# Patient Record
Sex: FEMALE | Race: WHITE | NOT HISPANIC OR LATINO | URBAN - METROPOLITAN AREA
[De-identification: names, ages, dates, MRNs, and addresses within clinical notes are randomized per-mention and may not be internally consistent; named-entity substitution may affect disease eponyms.]

---

## 2022-03-04 ENCOUNTER — INPATIENT (INPATIENT)
Facility: HOSPITAL | Age: 62
LOS: 1 days | Discharge: HOME | DRG: 501 | End: 2022-03-06
Attending: STUDENT IN AN ORGANIZED HEALTH CARE EDUCATION/TRAINING PROGRAM | Admitting: STUDENT IN AN ORGANIZED HEALTH CARE EDUCATION/TRAINING PROGRAM
Payer: COMMERCIAL

## 2022-03-04 VITALS
HEART RATE: 93 BPM | DIASTOLIC BLOOD PRESSURE: 97 MMHG | OXYGEN SATURATION: 96 % | HEIGHT: 62 IN | RESPIRATION RATE: 18 BRPM | WEIGHT: 136.03 LBS | SYSTOLIC BLOOD PRESSURE: 152 MMHG | TEMPERATURE: 99 F

## 2022-03-04 DIAGNOSIS — G56.02 CARPAL TUNNEL SYNDROME, LEFT UPPER LIMB: ICD-10-CM
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
## 2022-03-04 DIAGNOSIS — T79.2XXA TRAUMATIC SECONDARY AND RECURRENT HEMORRHAGE AND SEROMA, INITIAL ENCOUNTER: ICD-10-CM

## 2022-03-04 DIAGNOSIS — S52.592B OTHER FRACTURES OF LOWER END OF LEFT RADIUS, INITIAL ENCOUNTER FOR OPEN FRACTURE TYPE I OR II: ICD-10-CM

## 2022-03-04 DIAGNOSIS — S52.502A UNSPECIFIED FRACTURE OF THE LOWER END OF LEFT RADIUS, INITIAL ENCOUNTER FOR CLOSED FRACTURE: ICD-10-CM

## 2022-03-04 DIAGNOSIS — M67.432 GANGLION, LEFT WRIST: ICD-10-CM
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
## 2022-03-04 DIAGNOSIS — S52.612A DISPLACED FRACTURE OF LEFT ULNA STYLOID PROCESS, INITIAL ENCOUNTER FOR CLOSED FRACTURE: ICD-10-CM

## 2022-03-04 LAB
ANION GAP SERPL CALC-SCNC: 13 MMOL/L — SIGNIFICANT CHANGE UP (ref 5–17)
APTT BLD: 30.4 SEC — SIGNIFICANT CHANGE UP (ref 27.5–35.5)
BASOPHILS # BLD AUTO: 0.07 K/UL — SIGNIFICANT CHANGE UP (ref 0–0.2)
BASOPHILS NFR BLD AUTO: 0.4 % — SIGNIFICANT CHANGE UP (ref 0–2)
BLD GP AB SCN SERPL QL: NEGATIVE — SIGNIFICANT CHANGE UP
BUN SERPL-MCNC: 18 MG/DL — SIGNIFICANT CHANGE UP (ref 7–23)
CALCIUM SERPL-MCNC: 9.7 MG/DL — SIGNIFICANT CHANGE UP (ref 8.4–10.5)
CHLORIDE SERPL-SCNC: 102 MMOL/L — SIGNIFICANT CHANGE UP (ref 96–108)
CO2 SERPL-SCNC: 24 MMOL/L — SIGNIFICANT CHANGE UP (ref 22–31)
CREAT SERPL-MCNC: 0.77 MG/DL — SIGNIFICANT CHANGE UP (ref 0.5–1.3)
EGFR: 88 ML/MIN/1.73M2 — SIGNIFICANT CHANGE UP
EOSINOPHIL # BLD AUTO: 0.01 K/UL — SIGNIFICANT CHANGE UP (ref 0–0.5)
EOSINOPHIL NFR BLD AUTO: 0.1 % — SIGNIFICANT CHANGE UP (ref 0–6)
GLUCOSE SERPL-MCNC: 119 MG/DL — HIGH (ref 70–99)
HCT VFR BLD CALC: 43.2 % — SIGNIFICANT CHANGE UP (ref 34.5–45)
HGB BLD-MCNC: 14.1 G/DL — SIGNIFICANT CHANGE UP (ref 11.5–15.5)
IMM GRANULOCYTES NFR BLD AUTO: 0.6 % — SIGNIFICANT CHANGE UP (ref 0–1.5)
INR BLD: 1.13 — SIGNIFICANT CHANGE UP (ref 0.88–1.16)
LYMPHOCYTES # BLD AUTO: 1.46 K/UL — SIGNIFICANT CHANGE UP (ref 1–3.3)
LYMPHOCYTES # BLD AUTO: 8.4 % — LOW (ref 13–44)
MCHC RBC-ENTMCNC: 29.7 PG — SIGNIFICANT CHANGE UP (ref 27–34)
MCHC RBC-ENTMCNC: 32.6 GM/DL — SIGNIFICANT CHANGE UP (ref 32–36)
MCV RBC AUTO: 90.9 FL — SIGNIFICANT CHANGE UP (ref 80–100)
MONOCYTES # BLD AUTO: 0.9 K/UL — SIGNIFICANT CHANGE UP (ref 0–0.9)
MONOCYTES NFR BLD AUTO: 5.2 % — SIGNIFICANT CHANGE UP (ref 2–14)
NEUTROPHILS # BLD AUTO: 14.89 K/UL — HIGH (ref 1.8–7.4)
NEUTROPHILS NFR BLD AUTO: 85.3 % — HIGH (ref 43–77)
NRBC # BLD: 0 /100 WBCS — SIGNIFICANT CHANGE UP (ref 0–0)
PLATELET # BLD AUTO: 260 K/UL — SIGNIFICANT CHANGE UP (ref 150–400)
POTASSIUM SERPL-MCNC: 4.4 MMOL/L — SIGNIFICANT CHANGE UP (ref 3.5–5.3)
POTASSIUM SERPL-SCNC: 4.4 MMOL/L — SIGNIFICANT CHANGE UP (ref 3.5–5.3)
PROTHROM AB SERPL-ACNC: 13.5 SEC — HIGH (ref 10.5–13.4)
RBC # BLD: 4.75 M/UL — SIGNIFICANT CHANGE UP (ref 3.8–5.2)
RBC # FLD: 12.8 % — SIGNIFICANT CHANGE UP (ref 10.3–14.5)
RH IG SCN BLD-IMP: POSITIVE — SIGNIFICANT CHANGE UP
SARS-COV-2 RNA SPEC QL NAA+PROBE: SIGNIFICANT CHANGE UP
SODIUM SERPL-SCNC: 139 MMOL/L — SIGNIFICANT CHANGE UP (ref 135–145)
WBC # BLD: 17.43 K/UL — HIGH (ref 3.8–10.5)
WBC # FLD AUTO: 17.43 K/UL — HIGH (ref 3.8–10.5)

## 2022-03-04 PROCEDURE — 99285 EMERGENCY DEPT VISIT HI MDM: CPT

## 2022-03-04 PROCEDURE — 99221 1ST HOSP IP/OBS SF/LOW 40: CPT

## 2022-03-04 PROCEDURE — 71045 X-RAY EXAM CHEST 1 VIEW: CPT | Mod: 26

## 2022-03-04 PROCEDURE — 73200 CT UPPER EXTREMITY W/O DYE: CPT | Mod: 26,LT

## 2022-03-04 PROCEDURE — 73100 X-RAY EXAM OF WRIST: CPT | Mod: 26,LT

## 2022-03-04 RX ORDER — CEFAZOLIN SODIUM 1 G
2000 VIAL (EA) INJECTION EVERY 8 HOURS
Refills: 0 | Status: DISCONTINUED | OUTPATIENT
Start: 2022-03-05 | End: 2022-03-05

## 2022-03-04 RX ORDER — CEFAZOLIN SODIUM 1 G
1000 VIAL (EA) INJECTION ONCE
Refills: 0 | Status: COMPLETED | OUTPATIENT
Start: 2022-03-04 | End: 2022-03-04

## 2022-03-04 RX ORDER — MORPHINE SULFATE 50 MG/1
2 CAPSULE, EXTENDED RELEASE ORAL ONCE
Refills: 0 | Status: DISCONTINUED | OUTPATIENT
Start: 2022-03-04 | End: 2022-03-04

## 2022-03-04 RX ORDER — IBUPROFEN 200 MG
600 TABLET ORAL ONCE
Refills: 0 | Status: DISCONTINUED | OUTPATIENT
Start: 2022-03-04 | End: 2022-03-04

## 2022-03-04 RX ORDER — HYDROMORPHONE HYDROCHLORIDE 2 MG/ML
0.5 INJECTION INTRAMUSCULAR; INTRAVENOUS; SUBCUTANEOUS EVERY 4 HOURS
Refills: 0 | Status: DISCONTINUED | OUTPATIENT
Start: 2022-03-04 | End: 2022-03-06

## 2022-03-04 RX ORDER — OXYCODONE HYDROCHLORIDE 5 MG/1
10 TABLET ORAL EVERY 4 HOURS
Refills: 0 | Status: DISCONTINUED | OUTPATIENT
Start: 2022-03-04 | End: 2022-03-06

## 2022-03-04 RX ORDER — POVIDONE-IODINE 5 %
1 AEROSOL (ML) TOPICAL ONCE
Refills: 0 | Status: COMPLETED | OUTPATIENT
Start: 2022-03-05 | End: 2022-03-05

## 2022-03-04 RX ORDER — ACETAMINOPHEN 500 MG
1000 TABLET ORAL ONCE
Refills: 0 | Status: COMPLETED | OUTPATIENT
Start: 2022-03-04 | End: 2022-03-05

## 2022-03-04 RX ORDER — ACETAMINOPHEN 500 MG
975 TABLET ORAL EVERY 8 HOURS
Refills: 0 | Status: DISCONTINUED | OUTPATIENT
Start: 2022-03-04 | End: 2022-03-06

## 2022-03-04 RX ORDER — OXYCODONE HYDROCHLORIDE 5 MG/1
5 TABLET ORAL EVERY 4 HOURS
Refills: 0 | Status: DISCONTINUED | OUTPATIENT
Start: 2022-03-04 | End: 2022-03-06

## 2022-03-04 RX ORDER — SODIUM CHLORIDE 9 MG/ML
1000 INJECTION, SOLUTION INTRAVENOUS
Refills: 0 | Status: DISCONTINUED | OUTPATIENT
Start: 2022-03-04 | End: 2022-03-06

## 2022-03-04 RX ORDER — SODIUM CHLORIDE 9 MG/ML
1000 INJECTION INTRAMUSCULAR; INTRAVENOUS; SUBCUTANEOUS ONCE
Refills: 0 | Status: COMPLETED | OUTPATIENT
Start: 2022-03-04 | End: 2022-03-04

## 2022-03-04 RX ORDER — MAGNESIUM HYDROXIDE 400 MG/1
30 TABLET, CHEWABLE ORAL DAILY
Refills: 0 | Status: DISCONTINUED | OUTPATIENT
Start: 2022-03-04 | End: 2022-03-06

## 2022-03-04 RX ORDER — ONDANSETRON 8 MG/1
4 TABLET, FILM COATED ORAL ONCE
Refills: 0 | Status: COMPLETED | OUTPATIENT
Start: 2022-03-04 | End: 2022-03-04

## 2022-03-04 RX ADMIN — MORPHINE SULFATE 2 MILLIGRAM(S): 50 CAPSULE, EXTENDED RELEASE ORAL at 19:54

## 2022-03-04 RX ADMIN — MORPHINE SULFATE 2 MILLIGRAM(S): 50 CAPSULE, EXTENDED RELEASE ORAL at 21:44

## 2022-03-04 RX ADMIN — SODIUM CHLORIDE 2000 MILLILITER(S): 9 INJECTION INTRAMUSCULAR; INTRAVENOUS; SUBCUTANEOUS at 19:53

## 2022-03-04 RX ADMIN — Medication 100 MILLIGRAM(S): at 19:53

## 2022-03-04 RX ADMIN — ONDANSETRON 4 MILLIGRAM(S): 8 TABLET, FILM COATED ORAL at 19:54

## 2022-03-04 NOTE — CONSULT NOTE ADULT - SUBJECTIVE AND OBJECTIVE BOX
Patient is a 62y old  Female who presents with a chief complaint of L open distal radius fracture (04 Mar 2022 21:21)      HPI:  62F LHD no PMH presents after fall onto outstretched L hand while ice skating. Felt immediate severe pain at the wrist followed by swelling. Presents to [facility] ED due to persistent symptoms. In ED, was found to have L open distal radius fracture. Denies any numbness or tingling. Denies injury elsewhere.    Patient works as an  and is visiting [place] from [place] for an art convention.     (04 Mar 2022 21:21)      REVIEW OF SYSTEMS: see HPI    PAST MEDICAL & SURGICAL HISTORY:  No pertinent past medical history        FAMILY HISTORY:    SOCIAL HISTORY:  Tobacco use:  EtOH use:  Illicit drug use:    MEDICATIONS:  MEDICATIONS  (STANDING):  acetaminophen     Tablet .. 975 milliGRAM(s) Oral every 8 hours  acetaminophen   IVPB .. 1000 milliGRAM(s) IV Intermittent once  lactated ringers. 1000 milliLiter(s) (100 mL/Hr) IV Continuous <Continuous>  multivitamin 1 Tablet(s) Oral daily    MEDICATIONS  (PRN):  bisacodyl Suppository 10 milliGRAM(s) Rectal daily PRN Constipation  HYDROmorphone  Injectable 0.5 milliGRAM(s) IV Push every 4 hours PRN Breakthrough  magnesium hydroxide Suspension 30 milliLiter(s) Oral daily PRN Constipation  oxyCODONE    IR 10 milliGRAM(s) Oral every 4 hours PRN Severe Pain (7 - 10)  oxyCODONE    IR 5 milliGRAM(s) Oral every 4 hours PRN Moderate Pain (4 - 6)      ALLERGIES:  Allergies    No Known Allergies    Intolerances        VITAL SIGNS:  Vital Signs Last 24 Hrs  T(C): 37 (04 Mar 2022 19:02), Max: 37 (04 Mar 2022 19:02)  T(F): 98.6 (04 Mar 2022 19:02), Max: 98.6 (04 Mar 2022 19:02)  HR: 93 (04 Mar 2022 19:02) (93 - 93)  BP: 152/97 (04 Mar 2022 19:02) (152/97 - 152/97)  BP(mean): --  RR: 18 (04 Mar 2022 19:02) (18 - 18)  SpO2: 96% (04 Mar 2022 19:02) (96% - 96%)      PHYSICAL EXAM:  Constitutional: WDWN resting comfortably in bed; NAD  Head: NC/AT  Eyes: PERRL, EOMI, anicteric sclera  ENT: no nasal discharge; uvula midline, no oropharyngeal erythema or exudates; MMM  Neck: supple; no JVD or thyromegaly  Respiratory: CTA B/L; no W/R/R, no retractions  Cardiac: +S1/S2; RRR; no M/R/G; PMI non-displaced  Gastrointestinal: abdomen soft, NT/ND; no rebound or guarding; +BSx4  Genitourinary: normal external genitalia  Back: spine midline, no bony tenderness or step-offs; no CVAT B/L  Extremities: WWP, no clubbing or cyanosis; no peripheral edema  Musculoskeletal: NROM x4; no joint swelling, tenderness or erythema  Vascular: 2+ radial, femoral, DP/PT pulses B/L  Dermatologic: skin warm, dry and intact; no rashes, wounds, or scars  Lymphatic: no submandibular or cervical LAD  Neurologic: AAOx3; CNII-XII grossly intact; no focal deficits  Psychiatric: affect and characteristics of appearance, verbalizations, behaviors are appropriate    LABS:                        14.1   17.43 )-----------( 260      ( 04 Mar 2022 20:05 )             43.2     03-04    139  |  102  |  18  ----------------------------<  119<H>  4.4   |  24  |  0.77    Ca    9.7      04 Mar 2022 20:05      PT/INR - ( 04 Mar 2022 20:05 )   PT: 13.5 sec;   INR: 1.13          PTT - ( 04 Mar 2022 20:05 )  PTT:30.4 sec        CAPILLARY BLOOD GLUCOSE              RADIOLOGY & ADDITIONAL TESTS: Reviewed. Patient is a 62y old  Female who presents with a chief complaint of L open distal radius fracture (04 Mar 2022 21:21)      HPI:  63 y/o F with no PMH presents after fall onto outstretched L hand while ice skating. Patient is here visiting [facility] from [place] and states that she Felt immediate severe pain at the wrist followed by swelling. Presents to [facility] ED due to persistent symptoms. In ED, was found to have L open distal radius fracture. Patient Denies fever, chills, chest pain, palpitations, SOB, cough, abdominal pain, nausea, vomiting, diarrhea, constipation, urinary frequency, urgency, or dysuria, headaches, changes in vision, dizziness, numbness, tingling. Denies recent travel, recent antibiotic use, or sick contacts.    Patient works as an  and is visiting [place] from [place] for an art convention.     (04 Mar 2022 21:21)      REVIEW OF SYSTEMS: see HPI    PAST MEDICAL & SURGICAL HISTORY:  No pertinent past medical history    FAMILY HISTORY:    SOCIAL HISTORY:  Tobacco use:  EtOH use:  Illicit drug use:    MEDICATIONS:  MEDICATIONS  (STANDING):  acetaminophen     Tablet .. 975 milliGRAM(s) Oral every 8 hours  acetaminophen   IVPB .. 1000 milliGRAM(s) IV Intermittent once  lactated ringers. 1000 milliLiter(s) (100 mL/Hr) IV Continuous <Continuous>  multivitamin 1 Tablet(s) Oral daily    MEDICATIONS  (PRN):  bisacodyl Suppository 10 milliGRAM(s) Rectal daily PRN Constipation  HYDROmorphone  Injectable 0.5 milliGRAM(s) IV Push every 4 hours PRN Breakthrough  magnesium hydroxide Suspension 30 milliLiter(s) Oral daily PRN Constipation  oxyCODONE    IR 10 milliGRAM(s) Oral every 4 hours PRN Severe Pain (7 - 10)  oxyCODONE    IR 5 milliGRAM(s) Oral every 4 hours PRN Moderate Pain (4 - 6)      ALLERGIES:  Allergies    No Known Allergies    Intolerances        VITAL SIGNS:  Vital Signs Last 24 Hrs  T(C): 37 (04 Mar 2022 19:02), Max: 37 (04 Mar 2022 19:02)  T(F): 98.6 (04 Mar 2022 19:02), Max: 98.6 (04 Mar 2022 19:02)  HR: 93 (04 Mar 2022 19:02) (93 - 93)  BP: 152/97 (04 Mar 2022 19:02) (152/97 - 152/97)  BP(mean): --  RR: 18 (04 Mar 2022 19:02) (18 - 18)  SpO2: 96% (04 Mar 2022 19:02) (96% - 96%)      PHYSICAL EXAM:  Constitutional: WDWN resting comfortably in bed; NAD  Head: NC/AT  Eyes: PERRL, EOMI, anicteric sclera  ENT: no nasal discharge; uvula midline, no oropharyngeal erythema or exudates; MMM  Neck: supple; no JVD or thyromegaly  Respiratory: CTA B/L; no W/R/R, no retractions  Cardiac: +S1/S2; RRR; no M/R/G; PMI non-displaced  Gastrointestinal: abdomen soft, NT/ND; no rebound or guarding; +BSx4  Genitourinary: normal external genitalia  Back: spine midline, no bony tenderness or step-offs; no CVAT B/L  Extremities: WWP, no clubbing or cyanosis; no peripheral edema  Musculoskeletal: L arm sling with brace noted   Vascular: 2+ radial, femoral, DP/PT pulses B/L  Lymphatic: no submandibular or cervical LAD  Neurologic: AAOx3; CNII-XII grossly intact; no focal deficits  Psychiatric: affect and characteristics of appearance, verbalizations, behaviors are appropriate    LABS:                        14.1   17.43 )-----------( 260      ( 04 Mar 2022 20:05 )             43.2     03-04    139  |  102  |  18  ----------------------------<  119<H>  4.4   |  24  |  0.77    Ca    9.7      04 Mar 2022 20:05    PT/INR - ( 04 Mar 2022 20:05 )   PT: 13.5 sec;   INR: 1.13       PTT - ( 04 Mar 2022 20:05 )  PTT:30.4 sec    CAPILLARY BLOOD GLUCOSE    RADIOLOGY & ADDITIONAL TESTS: Reviewed. Patient is a 62y old  Female who presents with a chief complaint of L open distal radius fracture (04 Mar 2022 21:21)    HPI:  61 y/o F with no PMH presents after fall onto outstretched L hand while ice skating. Patient is here visiting [facility] from [place] and states that she Felt immediate severe pain at the wrist followed by swelling. Presents to [facility] ED due to persistent symptoms. In ED, was found to have L open distal radius fracture. Patient Denies fever, chills, chest pain, palpitations, SOB, cough, abdominal pain, nausea, vomiting, diarrhea, constipation, urinary frequency, urgency, or dysuria, headaches, changes in vision, dizziness, numbness, tingling. Denies recent travel, recent antibiotic use, or sick contacts.    Patient works as an  and [place] from [place] for an art convention.     (04 Mar 2022 21:21)      REVIEW OF SYSTEMS: see HPI    PAST MEDICAL & SURGICAL HISTORY:  No pertinent past medical history    FAMILY HISTORY:    SOCIAL HISTORY:  Tobacco use:  EtOH use:  Illicit drug use:    MEDICATIONS:  MEDICATIONS  (STANDING):  acetaminophen     Tablet .. 975 milliGRAM(s) Oral every 8 hours  acetaminophen   IVPB .. 1000 milliGRAM(s) IV Intermittent once  lactated ringers. 1000 milliLiter(s) (100 mL/Hr) IV Continuous <Continuous>  multivitamin 1 Tablet(s) Oral daily    MEDICATIONS  (PRN):  bisacodyl Suppository 10 milliGRAM(s) Rectal daily PRN Constipation  HYDROmorphone  Injectable 0.5 milliGRAM(s) IV Push every 4 hours PRN Breakthrough  magnesium hydroxide Suspension 30 milliLiter(s) Oral daily PRN Constipation  oxyCODONE    IR 10 milliGRAM(s) Oral every 4 hours PRN Severe Pain (7 - 10)  oxyCODONE    IR 5 milliGRAM(s) Oral every 4 hours PRN Moderate Pain (4 - 6)      ALLERGIES:  Allergies    No Known Allergies    Intolerances        VITAL SIGNS:  Vital Signs Last 24 Hrs  T(C): 37 (04 Mar 2022 19:02), Max: 37 (04 Mar 2022 19:02)  T(F): 98.6 (04 Mar 2022 19:02), Max: 98.6 (04 Mar 2022 19:02)  HR: 93 (04 Mar 2022 19:02) (93 - 93)  BP: 152/97 (04 Mar 2022 19:02) (152/97 - 152/97)  BP(mean): --  RR: 18 (04 Mar 2022 19:02) (18 - 18)  SpO2: 96% (04 Mar 2022 19:02) (96% - 96%)      PHYSICAL EXAM:  Constitutional: WDWN resting comfortably in bed; NAD  Head: NC/AT  Eyes: PERRL, EOMI, anicteric sclera  ENT: no nasal discharge; uvula midline, no oropharyngeal erythema or exudates; MMM  Neck: supple; no JVD or thyromegaly  Respiratory: CTA B/L; no W/R/R, no retractions  Cardiac: +S1/S2; RRR; no M/R/G; PMI non-displaced  Gastrointestinal: abdomen soft, NT/ND; no rebound or guarding; +BSx4  Genitourinary: normal external genitalia  Back: spine midline, no bony tenderness or step-offs; no CVAT B/L  Extremities: WWP, no clubbing or cyanosis; no peripheral edema  Musculoskeletal: L arm sling with brace noted   Vascular: 2+ radial, femoral, DP/PT pulses B/L  Lymphatic: no submandibular or cervical LAD  Neurologic: AAOx3; CNII-XII grossly intact; no focal deficits  Psychiatric: affect and characteristics of appearance, verbalizations, behaviors are appropriate    LABS:                        14.1   17.43 )-----------( 260      ( 04 Mar 2022 20:05 )             43.2     03-04    139  |  102  |  18  ----------------------------<  119<H>  4.4   |  24  |  0.77    Ca    9.7      04 Mar 2022 20:05    PT/INR - ( 04 Mar 2022 20:05 )   PT: 13.5 sec;   INR: 1.13       PTT - ( 04 Mar 2022 20:05 )  PTT:30.4 sec    CAPILLARY BLOOD GLUCOSE    RADIOLOGY & ADDITIONAL TESTS: Reviewed. Patient is a 62y old  Female who presents with a chief complaint of L open distal radius fracture (04 Mar 2022 21:21)    HPI:  63 y/o F with no PMH presents after fall onto outstretched L hand while ice skating. Patient is here visiting [facility] from [place] and states that she Felt immediate severe pain at the wrist followed by swelling. Presents to [facility] ED due to persistent symptoms. In ED, was found to have L open distal radius fracture. Patient Denies fever, chills, chest pain, palpitations, SOB, cough, abdominal pain, nausea, vomiting, diarrhea, constipation, urinary frequency, urgency, or dysuria, headaches, changes in vision, dizziness, numbness, tingling. Denies recent travel, recent antibiotic use, or sick contacts.    Patient works as an  and is visiting [place] from [place] for an art convention.     (04 Mar 2022 21:21)      REVIEW OF SYSTEMS: see HPI    PAST MEDICAL & SURGICAL HISTORY:  No pertinent past medical history    FAMILY HISTORY: mother brain aneurysm, father w/ dementia     SOCIAL HISTORY:   Tobacco use: denies  EtOH use: denies    Illicit drug use: denies  works as a  in [place]    MEDICATIONS:  MEDICATIONS  (STANDING):  acetaminophen     Tablet .. 975 milliGRAM(s) Oral every 8 hours  acetaminophen   IVPB .. 1000 milliGRAM(s) IV Intermittent once  lactated ringers. 1000 milliLiter(s) (100 mL/Hr) IV Continuous <Continuous>  multivitamin 1 Tablet(s) Oral daily    MEDICATIONS  (PRN):  bisacodyl Suppository 10 milliGRAM(s) Rectal daily PRN Constipation  HYDROmorphone  Injectable 0.5 milliGRAM(s) IV Push every 4 hours PRN Breakthrough  magnesium hydroxide Suspension 30 milliLiter(s) Oral daily PRN Constipation  oxyCODONE    IR 10 milliGRAM(s) Oral every 4 hours PRN Severe Pain (7 - 10)  oxyCODONE    IR 5 milliGRAM(s) Oral every 4 hours PRN Moderate Pain (4 - 6)      ALLERGIES:  Allergies    No Known Allergies    Intolerances        VITAL SIGNS:  Vital Signs Last 24 Hrs  T(C): 37 (04 Mar 2022 19:02), Max: 37 (04 Mar 2022 19:02)  T(F): 98.6 (04 Mar 2022 19:02), Max: 98.6 (04 Mar 2022 19:02)  HR: 93 (04 Mar 2022 19:02) (93 - 93)  BP: 152/97 (04 Mar 2022 19:02) (152/97 - 152/97)  BP(mean): --  RR: 18 (04 Mar 2022 19:02) (18 - 18)  SpO2: 96% (04 Mar 2022 19:02) (96% - 96%)      PHYSICAL EXAM:  Constitutional: WDWN resting comfortably in bed; NAD  Head: NC/AT  Eyes: PERRL, EOMI, anicteric sclera  ENT: no nasal discharge; uvula midline, no oropharyngeal erythema or exudates; MMM  Neck: supple; no JVD or thyromegaly  Respiratory: CTA B/L; no W/R/R, no retractions  Cardiac: +S1/S2; RRR; no M/R/G; PMI non-displaced  Gastrointestinal: abdomen soft, NT/ND; no rebound or guarding; +BSx4  Genitourinary: normal external genitalia  Back: spine midline, no bony tenderness or step-offs; no CVAT B/L  Extremities: WWP, no clubbing or cyanosis; no peripheral edema  Musculoskeletal: L arm sling with brace noted   Vascular: 2+ radial, femoral, DP/PT pulses B/L  Lymphatic: no submandibular or cervical LAD  Neurologic: AAOx3; CNII-XII grossly intact; no focal deficits  Psychiatric: affect and characteristics of appearance, verbalizations, behaviors are appropriate    LABS:                        14.1   17.43 )-----------( 260      ( 04 Mar 2022 20:05 )             43.2     03-04    139  |  102  |  18  ----------------------------<  119<H>  4.4   |  24  |  0.77    Ca    9.7      04 Mar 2022 20:05    PT/INR - ( 04 Mar 2022 20:05 )   PT: 13.5 sec;   INR: 1.13       PTT - ( 04 Mar 2022 20:05 )  PTT:30.4 sec    CAPILLARY BLOOD GLUCOSE    RADIOLOGY & ADDITIONAL TESTS: Reviewed.

## 2022-03-04 NOTE — CONSULT NOTE ADULT - PROBLEM SELECTOR RECOMMENDATION 9
#L distal ulnar and radial fracture 2/2 to Mechanical Fall   Mildly displaced fractures of distal radius and ulna   - Analgesia PRN   - Scheduled for OR tomorrow AM as per the primary team

## 2022-03-04 NOTE — ED PROVIDER NOTE - OBJECTIVE STATEMENT
62 F LHD visiting from [place] referred from  for L wrist pain s/p injury found to have comminuted displaced L distal radius/ulna fracture.  pt was ice skating and lost footing falling backwards on L hand- no head trauma or loc.  Now w/ dull throbbing pain in L wrist w/ deformity and open wound to volar surface of wrist w/ + bleeding.  tetanus utd.  denies f/c, headache, neck/back pain, chest pain, sob, nv, numbness/weakness, pain in L shoulder/elbow/digits, other injuries.

## 2022-03-04 NOTE — ED PROVIDER NOTE - PHYSICAL EXAMINATION
Vitals reviewed  Gen: comfortable appearing, nad, speaking in full sentences  Skin: wwp, puncture wound w/ oozing blood noted to volar aspect wrist   HEENT: ncat, eomi, mmm  Neck/Back: no midline ttp/step off   CV: rrr, no audible m/r/g  Resp: symmetrical expansion, ctab, no w/r/r  LUE: + deformity and diffuse ttp over wrist, FROM elbow/shoulder and digits, cap refill < 2 sec, SILT, radial pulse 2+  FROM all other ext, NVI   Neuro: alert/oriented, no focal deficits, steady gait

## 2022-03-04 NOTE — H&P ADULT - ASSESSMENT
A/P: 62F presents with L open distal radius fracture following fall onto outstretched hand. Sugartong splint applied.  -NWB LUE in splint and sling  -Pain control per ED  -Medicine consulted for pre-op clearance  -Pre-op workup- CBC, BMP, PT/PTT/INR, T&S, UA, EKG, CXR, COVID  -Obtain post-splint CT scan and x-ray  -Pain control  -NPO/IVF past midnight  -Discussed with Dr. Huang

## 2022-03-04 NOTE — CONSULT NOTE ADULT - PROBLEM SELECTOR RECOMMENDATION 2
VSS  No signs of infection   - Obtain UA  - Trend WBC  - If febrile obtain septic ALCALA -EKG: NSR with no ischemic changes  -Social: denied smoking tobacco, alcohol use, or illicit drug use  -Self-reported physical activity: no limitation walking on flat ground or stairs    Assessment:  -Mets >4  -RCRI - Class I risk, indicating 3.9 % 30-day risk of death, MI, or cardiac arrest  -[place] - 0.0% risk of MI or cardiac arrest within 30 days of surgery    Patient is intermediate-risk for low intermediate-risk surgery. Optimized for procedure no further testing needed

## 2022-03-04 NOTE — ED PROVIDER NOTE - NSFOLLOWUPINSTRUCTIONS_ED_ALL_ED_FT
Take tylenol 650mg or motrin 400-800mg as needed every 4-6 hours for pain.   REST- Rest your hurting/injured joint or extremity to decrease pain and swelling for 24-48 hours    ICE- Apply ice to area of pain to decreased inflammation and pain, put towel/barrier between ice and skin. You can keep ice on for 20 minutes at a time 4-8 times daily   COMPRESSION- Wear ace wrap or brace for support to reduce swelling.  Make sure not to wrap too tight, loosen if skin feeling numb/tingling or skin turns blue   ELEVATION- Elevate hurting/injured area 6 or more inches about level of heart to decrease swelling/inflammation.  Use pillow under joint to elevate area      Wrist Fracture in Adults    WHAT YOU NEED TO KNOW:    A wrist fracture is a break in one or more of the bones in your wrist.     Adult Arm Bones         DISCHARGE INSTRUCTIONS:    Return to the emergency department if:   •Your pain gets worse or does not get better after you take pain medicine.      •Your cast or splint breaks, gets wet, or is damaged.      •Your hand or fingers feel numb or cold.      •Your hand or fingers turn white or blue.      •Your splint or cast feels too tight.      •You have more pain or swelling after the cast or splint is put on.      Call your doctor if:   •You have a fever.      •There is a foul smell or blood coming from under the cast.      •You have questions or concerns about your condition or care.      Medicines: You may need any of the following:   •Prescription pain medicine may be given. Ask your healthcare provider how to take this medicine safely. Some prescription pain medicines contain acetaminophen. Do not take other medicines that contain acetaminophen without talking to your healthcare provider. Too much acetaminophen may cause liver damage. Prescription pain medicine may cause constipation. Ask your healthcare provider how to prevent or treat constipation.       •NSAIDs, such as ibuprofen, help decrease swelling, pain, and fever. NSAIDs can cause stomach bleeding or kidney problems in certain people. If you take blood thinner medicine, always ask your healthcare provider if NSAIDs are safe for you. Always read the medicine label and follow directions.      •Acetaminophen decreases pain and fever. It is available without a doctor's order. Ask how much to take and how often to take it. Follow directions. Read the labels of all other medicines you are using to see if they also contain acetaminophen, or ask your doctor or pharmacist. Acetaminophen can cause liver damage if not taken correctly. Do not use more than 4 grams (4,000 milligrams) total of acetaminophen in one day.       •Take your medicine as directed. Contact your healthcare provider if you think your medicine is not helping or if you have side effects. Tell him or her if you are allergic to any medicine. Keep a list of the medicines, vitamins, and herbs you take. Include the amounts, and when and why you take them. Bring the list or the pill bottles to follow-up visits. Carry your medicine list with you in case of an emergency.      Self-care:   •Rest as much as possible. Do not play contact sports until the healthcare provider says it is okay.      •Apply ice on your wrist for 15 to 20 minutes every hour or as directed. Use an ice pack, or put crushed ice in a plastic bag. Cover it with a towel before you place it on your skin. Ice helps prevent tissue damage and decreases swelling and pain.      •Elevate your wrist above the level of your heart as often as possible. This will help decrease swelling and pain. Prop your wrist on pillows or blankets to keep it elevated comfortably.             Cast or splint care:   •You may take a bath or shower as directed. Do not let your cast or splint get wet. Before bathing, cover the cast or splint with 2 plastic trash bags. Tape the bags to your skin above the cast or splint to seal out the water. Keep your arm out of the water in case the bag breaks. If a plaster cast gets wet and soft, call your healthcare provider.      •Check the skin around the cast or splint every day. You may put lotion on any red or sore areas.      •Do not push down or lean on the cast or brace because it may break.      •Do not scratch the skin under the cast by putting a sharp or pointed object inside the cast.      Go to physical therapy as directed: You may need physical therapy after your wrist heals and the cast is removed. A physical therapist can teach you exercises to help improve movement and strength and to decrease pain.    Follow up with your doctor or bone specialist as directed: You may need to return to have your cast removed. You may also need an x-ray to check how well the bone has healed. Write down your questions so you remember to ask them during your visits.

## 2022-03-04 NOTE — CONSULT NOTE ADULT - ASSESSMENT
*******************INCOMPLETE NOTE#******************  62 F LHD no PMH presents after fall onto outstretched L hand while ice skating. Felt immediate severe pain at the wrist followed by swelling. Presents to [facility] ED due to persistent symptoms. In ED, was found to have L open distal radius fracture. Denies any numbness or tingling. Denies injury elsewhere.    #Reactive Leucocytosis in the setting of fall   VSS  No signs of infection   - Obtain UA  - Trend WBC  - If febrile obtain septic ALCALA     -EKG:  -Social:  -Self-reported physical activity: no limitation walking on flat ground or stairs    Assessment:  -Mets >4  -RCRI - Class ___ risk, indicating ___% 30-day risk of death, MI, or cardiac arrest  -Elly - ___% risk of MI or cardiac arrest within 30 days of surgery    _____ is ___-risk for ___-risk surgery. 62 F LHD no PMH presents after fall onto outstretched L hand while ice skating. Felt immediate severe pain at the wrist followed by swelling. Presents to [facility] ED due to persistent symptoms. In ED, was found to have L open distal radius fracture. Denies any numbness or tingling. Denies injury elsewhere. Medicine consulted for pre-op clearance    #Reactive Leucocytosis in the setting of fall   VSS  No signs of infection   - Obtain UA  - Trend WBC  - If febrile obtain septic ALCALA     #Pre-op  -EKG: NSR with no ischemic changes  -Social: denied smoking tobacco, alcohol use, or illicit drug use  -Self-reported physical activity: no limitation walking on flat ground or stairs    Assessment:  -Mets >4  -RCRI - Class I risk, indicating 3.9 % 30-day risk of death, MI, or cardiac arrest  -[place] - 0.0% risk of MI or cardiac arrest within 30 days of surgery    Patient is intermediate-risk for low intermediate-risk surgery.    #HCM   - Replete electrolytes accordingly  - Obtain TSH, b12, folate, a1c 62 F D no PMH presents after fall onto outstretched L hand while ice skating. Felt immediate severe pain at the wrist followed by swelling. Presents to [facility] ED due to persistent symptoms. In ED, was found to have L open distal radius fracture. Denies any numbness or tingling. Denies injury elsewhere. Medicine consulted for pre-op clearance      #L distal ulnar and radial fracture 2/2 to Mechanical Fall   Mildly displaced fractures of distal radius and ulna   - Analgesia PRN   - Scheduled for OR tomorrow AM as per the primary team     #Reactive Leucocytosis in the setting of fall   VSS  No signs of infection   - Obtain UA  - Trend WBC  - If febrile obtain septic ALCALA     #Pre-op  -EKG: NSR with no ischemic changes  -Social: denied smoking tobacco, alcohol use, or illicit drug use  -Self-reported physical activity: no limitation walking on flat ground or stairs    Assessment:  -Mets >4  -RCRI - Class I risk, indicating 3.9 % 30-day risk of death, MI, or cardiac arrest  -[place] - 0.0% risk of MI or cardiac arrest within 30 days of surgery    Patient is intermediate-risk for low intermediate-risk surgery.    #HCM   - Replete electrolytes accordingly  - Obtain TSH, b12, folate, a1c  - DVT PPX post op  62 F D no PMH presents after fall onto outstretched L hand while ice skating. Felt immediate severe pain at the wrist followed by swelling. Presents to [facility] ED due to persistent symptoms. In ED, was found to have L open distal radius fracture. Denies any numbness or tingling. Denies injury elsewhere. Medicine consulted for pre-op clearance      #L distal ulnar and radial fracture 2/2 to Mechanical Fall   Mildly displaced fractures of distal radius and ulna   - Analgesia PRN   - Scheduled for OR tomorrow AM as per the primary team     #Reactive Leucocytosis in the setting of fall   VSS  No signs of infection   - Obtain UA  - Trend WBC  - If febrile obtain septic ALCALA     #Pre-op  -EKG: NSR with no ischemic changes  -Social: denied smoking tobacco, alcohol use, or illicit drug use  -Self-reported physical activity: no limitation walking on flat ground or stairs    Assessment:  -Mets >4  -RCRI - Class I risk, indicating 3.9 % 30-day risk of death, MI, or cardiac arrest  -[place] - 0.0% risk of MI or cardiac arrest within 30 days of surgery    Patient is intermediate-risk for low intermediate-risk surgery.    #HCM   - Replete electrolytes accordingly  - DVT PPX post op  62 F D no PMH presents after fall onto outstretched L hand while ice skating. Felt immediate severe pain at the wrist followed by swelling. Presents to [facility] ED due to persistent symptoms. In ED, was found to have L open distal radius fracture. Denies any numbness or tingling. Denies injury elsewhere. Medicine consulted for pre-op clearance

## 2022-03-04 NOTE — CONSULT NOTE ADULT - PROBLEM SELECTOR RECOMMENDATION 3
-EKG: NSR with no ischemic changes  -Social: denied smoking tobacco, alcohol use, or illicit drug use  -Self-reported physical activity: no limitation walking on flat ground or stairs    Assessment:  -Mets >4  -RCRI - Class I risk, indicating 3.9 % 30-day risk of death, MI, or cardiac arrest  -[place] - 0.0% risk of MI or cardiac arrest within 30 days of surgery    Patient is intermediate-risk for low intermediate-risk surgery. -EKG: NSR with no ischemic changes  -Social: denied smoking tobacco, alcohol use, or illicit drug use  -Self-reported physical activity: no limitation walking on flat ground or stairs    Assessment:  -Mets >4  -RCRI - Class I risk, indicating 3.9 % 30-day risk of death, MI, or cardiac arrest  -[place] - 0.0% risk of MI or cardiac arrest within 30 days of surgery    Patient is intermediate-risk for low intermediate-risk surgery. Optimized for procedure no further testing needed VSS  No signs of infection   - Obtain UA  - Trend WBC  - If febrile obtain septic ALCALA

## 2022-03-04 NOTE — ED ADULT NURSE NOTE - CHIEF COMPLAINT
pt with allergic reaction s/p epi, symptoms improved, will continue to monitor. The patient is a 62y Female complaining of wrist pain/injury. pt with allergic reaction s/p epi, symptoms improved.  Will monitor ~6h for biphasic reactions.  EpiPen to 24-hours pharmacy to be picked up en-route home.  Ryan Archer MD

## 2022-03-04 NOTE — ED PROVIDER NOTE - CLINICAL SUMMARY MEDICAL DECISION MAKING FREE TEXT BOX
62 F LHD visiting from [place] referred from  for L wrist pain s/p injury found to have comminuted displaced L distal radius/ulna fracture.  on exam VSS, puncture wound w/ oozing blood noted to volar aspect wrist ;  LUE: + deformity and diffuse ttp over wrist, FROM elbow/shoulder and digits, cap refill < 2 sec, SILT, radial pulse 2+.  xrays from  uploaded,  concern for open fx will obtain labs, given analgesia, ancef and c/s ortho

## 2022-03-04 NOTE — ED ADULT NURSE NOTE - OBJECTIVE STATEMENT
61 y/o female s/p fall on left wrist while ice skating, deformity noted, open area on wrist noted, sent in by urgent care for further evaluation.

## 2022-03-04 NOTE — H&P ADULT - NSHPPHYSICALEXAM_GEN_ALL_CORE
Physical Exam:  General: NAD  LUE:  Swelling about wrist  Poke hole with continuous oozing to volar aspect of forearm  SILT throughout limb  Firing all distal musculature, full motor testing limited due to pain  Radial pulse palpable

## 2022-03-04 NOTE — H&P ADULT - HISTORY OF PRESENT ILLNESS
62F D no PMH presents after fall onto outstretched L hand while ice skating. Felt immediate severe pain at the wrist followed by swelling. Presents to [facility] ED due to persistent symptoms. In ED, was found to have L open distal radius fracture. Denies any numbness or tingling. Denies injury elsewhere.    Patient works as an  and is visiting [place] from [place] for an art convention.

## 2022-03-04 NOTE — ED PROVIDER NOTE - CARE PROVIDER_API CALL
Glynn Huang)  Orthopaedic Surgery  [address] [place] [address], [address]  [place] [address]  Phone: [phone]  Fax: [phone]  Follow Up Time:

## 2022-03-04 NOTE — CONSULT NOTE ADULT - ATTENDING COMMENTS
reviewed pertinent data , consult note  patient seen and examined overnight   a/f left distal radius / ulnar fx repair.   pt is an avid long distance runner, trains w/ 2 personal trainers    pleasant , healthy appearing , slightly anxious female; PE as above, sensation intact at left fingers    1. preop: pt is medically optimized for pending procedure. no further studies needed. will follow post op        rest of  plan as above

## 2022-03-04 NOTE — H&P ADULT - NSHPLABSRESULTS_GEN_ALL_CORE
Imaging:  X-ray wrist (AP, lateral, oblique) - Distal radius fracture, severely comminuted and impacted

## 2022-03-04 NOTE — ED PROVIDER NOTE - NS ED MD DISPO SPECIAL CONSIDERATION1
CC: f/u for  cellulitis right leg  Patient reports she is feeling better    REVIEW OF SYSTEMS:  All other review of systems negative (Comprehensive ROS)    Antimicrobials Day #  :3  ceFAZolin   IVPB 1000 milliGRAM(s) IV Intermittent every 8 hours    Other Medications Reviewed    T(F): 97.9 (01-09-20 @ 10:37), Max: 98.3 (01-08-20 @ 16:55)  HR: 73 (01-09-20 @ 10:37)  BP: 123/60 (01-09-20 @ 10:37)  RR: 17 (01-09-20 @ 10:37)  SpO2: 99% (01-09-20 @ 10:37)  Wt(kg): --    PHYSICAL EXAM:  General: alert, no acute distress  Eyes:  anicteric, no conjunctival injection, no discharge  Oropharynx: no lesions or injection 	  Neck: supple, without adenopathy  Lungs: clear to auscultation  Heart: regular rate and rhythm; no murmur, rubs or gallops  Abdomen: soft, nondistended, nontender, without mass or organomegaly  Skin: no lesions  Extremities: no clubbing, cyanosis,. right leg is much better lymphedema persists  Neurologic: alert, oriented, moves all extremities    LAB RESULTS:              MICROBIOLOGY:  RECENT CULTURES:  01-06 @ 22:45 .Blood Blood-Peripheral     No growth to date.          RADIOLOGY REVIEWED:  < from: US Duplex Venous Lower Ext Ltd, Right (01.06.20 @ 23:45) >    EXAM:  US DPLX LWR EXT VEINS LTD RT      PROCEDURE DATE:  01/06/2020        INTERPRETATION:  CLINICAL INFORMATION: Right lower extremity swelling and redness with pain for 2 days. History of metastatic cancer. Evaluate for DVT.    COMPARISON: Bilateral lower extremity venous duplex from 9/20/2016    TECHNIQUE: Duplex sonography of the RIGHT LOWER extremity veins with color and spectral Doppler, with and without compression.      FINDINGS:    There is normal compressibility of the right common femoral, femoral and popliteal veins. No calf vein thrombosis is detected.    The contralateral common femoral vein is patent.    Doppler examination shows normal spontaneous and phasic flow.    There is nonspecific moderate subcutaneous edema in the right calf.    IMPRESSION:     No evidence of right lower extremity deep venous thrombosis.    < end of copied text >              Assessment:  patient with lymphedema legs admitted with recurrent episode of cellulitis of the right leg which is now improved on cefazolin.   Plan:  change to po cefadroxil 1gram po bid for another 11 days  no ID objection to d/c home  would give a script to have cefadroxil at home in case another episodes starts
CC: f/u for cellulitis right leg    Patient reports  feels ok  REVIEW OF SYSTEMS:  All other review of systems negative (Comprehensive ROS)    Antimicrobials Day #  :3  ceFAZolin   IVPB 1000 milliGRAM(s) IV Intermittent every 8 hours    Other Medications Reviewed    T(F): 98.2 (01-08-20 @ 10:47), Max: 98.6 (01-07-20 @ 16:22)  HR: 66 (01-08-20 @ 10:47)  BP: 120/51 (01-08-20 @ 10:47)  RR: 17 (01-08-20 @ 05:39)  SpO2: 96% (01-08-20 @ 10:47)  Wt(kg): --    PHYSICAL EXAM:  General: alert, no acute distress  Eyes:  anicteric, no conjunctival injection, no discharge  Oropharynx: no lesions or injection 	  Neck: supple, without adenopathy  Lungs: clear to auscultation  Heart: regular rate and rhythm; no murmur, rubs or gallops  Abdomen: soft, nondistended, nontender, without mass or organomegaly  Skin: no lesions  Extremities: no clubbing, cyanosis,. right leg redness is improved, ongoing bilateral lymphedema  Neurologic: alert, oriented, moves all extremities    LAB RESULTS:                        12.3   6.19  )-----------( 239      ( 07 Jan 2020 06:20 )             39.1     01-07    142  |  104  |  15  ----------------------------<  121<H>  3.7   |  27  |  0.76    Ca    8.6      07 Jan 2020 06:20    TPro  8.5<H>  /  Alb  3.3  /  TBili  0.7  /  DBili  x   /  AST  42<H>  /  ALT  18  /  AlkPhos  95  01-06    LIVER FUNCTIONS - ( 06 Jan 2020 22:45 )  Alb: 3.3 g/dL / Pro: 8.5 g/dL / ALK PHOS: 95 U/L / ALT: 18 U/L / AST: 42 U/L / GGT: x             MICROBIOLOGY:  RECENT CULTURES:  01-06 @ 22:45 .Blood Blood-Peripheral     No growth to date.          RADIOLOGY REVIEWED:    < from: US Duplex Venous Lower Ext Ltd, Right (01.06.20 @ 23:45) >  EXAM:  US DPLX LWR EXT VEINS LTD RT      PROCEDURE DATE:  01/06/2020        INTERPRETATION:  CLINICAL INFORMATION: Right lower extremity swelling and redness with pain for 2 days. History of metastatic cancer. Evaluate for DVT.    COMPARISON: Bilateral lower extremity venous duplex from 9/20/2016    TECHNIQUE: Duplex sonography of the RIGHT LOWER extremity veins with color and spectral Doppler, with and without compression.      FINDINGS:    There is normal compressibility of the right common femoral, femoral and popliteal veins. No calf vein thrombosis is detected.    The contralateral common femoral vein is patent.    Doppler examination shows normal spontaneous and phasic flow.    There is nonspecific moderate subcutaneous edema in the right calf.    IMPRESSION:     No evidence of right lower extremity deep venous thrombosis.      < end of copied text >            Assessment:  patient with obesity, bilateral lymphedema, neuroendocrine tumor on shots, rle cellulitis which is improved on cefazolin  Plan:  continue cefazolin  continue elevation  if continues to do well, can change to po keflex tomorrow and discharge from ID perspective
Patient is a 61y old  Female who presents with a chief complaint of R leg cellulitis (07 Jan 2020 00:38)      INTERVAL HPI/OVERNIGHT EVENTS:  comfortable      REVIEW OF SYSTEMS:  CONSTITUTIONAL: No fever, weight loss, or fatigue  EYES: No eye pain, visual disturbances, or discharge  ENMT:  No difficulty hearing, tinnitus, vertigo; No sinus or throat pain  NECK: No pain or stiffness  BREASTS: No pain, masses, or nipple discharge  RESPIRATORY: No cough, wheezing, chills or hemoptysis; No shortness of breath  CARDIOVASCULAR: No chest pain, palpitations, dizziness, or leg swelling  GASTROINTESTINAL: No abdominal or epigastric pain. No nausea, vomiting, or hematemesis; No diarrhea or constipation. No melena or hematochezia.  GENITOURINARY: No dysuria, frequency, hematuria, or incontinence  NEUROLOGICAL: No headaches, memory loss, loss of strength, numbness, or tremors  SKIN: No itching, burning, rashes, or lesions   LYMPH NODES: No enlarged glands  ENDOCRINE: No heat or cold intolerance; No hair loss  MUSCULOSKELETAL: No joint pain or swelling; No muscle, back, or extremity pain  PSYCHIATRIC: No depression, anxiety, mood swings, or difficulty sleeping  HEME/LYMPH: No easy bruising, or bleeding gums  ALLERY AND IMMUNOLOGIC: No hives or eczema  FAMILY HISTORY:  FH: HTN (hypertension)    T(C): 37.2 (01-07-20 @ 05:22), Max: 37.2 (01-07-20 @ 05:22)  HR: 80 (01-07-20 @ 06:30) (75 - 89)  BP: 125/58 (01-07-20 @ 06:30) (125/58 - 155/78)  RR: 17 (01-07-20 @ 05:22) (16 - 17)  SpO2: 97% (01-07-20 @ 05:22) (95% - 100%)  Wt(kg): --Vital Signs Last 24 Hrs  T(C): 37.2 (07 Jan 2020 05:22), Max: 37.2 (07 Jan 2020 05:22)  T(F): 99 (07 Jan 2020 05:22), Max: 99 (07 Jan 2020 05:22)  HR: 80 (07 Jan 2020 06:30) (75 - 89)  BP: 125/58 (07 Jan 2020 06:30) (125/58 - 155/78)  BP(mean): 85 (07 Jan 2020 01:25) (85 - 85)  RR: 17 (07 Jan 2020 05:22) (16 - 17)  SpO2: 97% (07 Jan 2020 05:22) (95% - 100%)    T(F): 99 (01-07-20 @ 05:22), Max: 99 (01-07-20 @ 05:22)  HR: 80 (01-07-20 @ 06:30) (75 - 89)  BP: 125/58 (01-07-20 @ 06:30) (125/58 - 155/78)  RR: 17 (01-07-20 @ 05:22) (16 - 17)  SpO2: 97% (01-07-20 @ 05:22) (95% - 100%)    PHYSICAL EXAM:  GENERAL: NAD, well-groomed, well-developed  HEAD:  Atraumatic, Normocephalic  EYES: EOMI, PERRLA, conjunctiva and sclera clear  ENMT: No tonsillar erythema, exudates, or enlargement; Moist mucous membranes, Good dentition, No lesions  NECK: Supple, No JVD, Normal thyroid  NERVOUS SYSTEM:  Alert & Oriented X3, Good concentration; Motor Strength 5/5 B/L upper and lower extremities; DTRs 2+ intact and symmetric  CHEST/LUNG: Clear to percussion bilaterally; No rales, rhonchi, wheezing, or rubs  HEART: Regular rate and rhythm; No murmurs, rubs, or gallops  ABDOMEN: Soft, Nontender, Nondistended; Bowel sounds present  EXTREMITIES:  2+ Peripheral Pulses, No clubbing, cyanosis, or edema  LYMPH: No lymphadenopathy noted  SKIN: No rashes or lesions    Consultant(s) Notes Reviewed:  [x ] YES  [ ] NO  Care Discussed with Consultants/Other Providers [ x] YES  [ ] NO    LABS:  01-06    139  |  102  |  15  ----------------------------<  181<H>  4.8   |  28  |  0.81    Ca    8.9      06 Jan 2020 22:45    TPro  8.5<H>  /  Alb  3.3  /  TBili  0.7  /  DBili  x   /  AST  42<H>  /  ALT  18  /  AlkPhos  95  01-06                          12.3   6.19  )-----------( 239      ( 07 Jan 2020 06:20 )             39.1         PT/INR - ( 06 Jan 2020 22:45 )   PT: 12.6 sec;   INR: 1.12 ratio         PTT - ( 06 Jan 2020 22:45 )  PTT:27.6 sec  LIVER FUNCTIONS - ( 06 Jan 2020 22:45 )  Alb: 3.3 g/dL / Pro: 8.5 g/dL / ALK PHOS: 95 U/L / ALT: 18 U/L / AST: 42 U/L / GGT: x                            12.3   6.19  )-----------( 239      ( 01-07 @ 06:20 )             39.1                13.6   7.03  )-----------( 240      ( 01-06 @ 22:45 )             42.5                14.6   7.0   )-----------( 256      ( 12-20 @ 15:36 )             45.3               RADIOLOGY & ADDITIONAL TESTS:    Imaging Personally Reviewed:  [ ] YES  [ ] NO  acetaminophen   Tablet .. 650 milliGRAM(s) Oral every 6 hours PRN  amLODIPine   Tablet 10 milliGRAM(s) Oral daily  ceFAZolin   IVPB 1000 milliGRAM(s) IV Intermittent every 8 hours  enoxaparin Injectable 40 milliGRAM(s) SubCutaneous daily  ferrous    sulfate 325 milliGRAM(s) Oral daily  losartan 100 milliGRAM(s) Oral daily  pantoprazole    Tablet 40 milliGRAM(s) Oral before breakfast      HEALTH ISSUES - PROBLEM Dx:
Patient is a 61y old  Female who presents with a chief complaint of R leg cellulitis (07 Jan 2020 11:35)      INTERVAL HPI/OVERNIGHT EVENTS:  resting comfortably      REVIEW OF SYSTEMS:  CONSTITUTIONAL: No fever, weight loss, or fatigue  EYES: No eye pain, visual disturbances, or discharge  ENMT:  No difficulty hearing, tinnitus, vertigo; No sinus or throat pain  NECK: No pain or stiffness  BREASTS: No pain, masses, or nipple discharge  RESPIRATORY: No cough, wheezing, chills or hemoptysis; No shortness of breath  CARDIOVASCULAR: No chest pain, palpitations, dizziness, or leg swelling  GASTROINTESTINAL: No abdominal or epigastric pain. No nausea, vomiting, or hematemesis; No diarrhea or constipation. No melena or hematochezia.  GENITOURINARY: No dysuria, frequency, hematuria, or incontinence  NEUROLOGICAL: No headaches, memory loss, loss of strength, numbness, or tremors  SKIN: No itching, burning, rashes, or lesions   LYMPH NODES: No enlarged glands  ENDOCRINE: No heat or cold intolerance; No hair loss  MUSCULOSKELETAL: No joint pain or swelling; No muscle, back, or extremity pain  PSYCHIATRIC: No depression, anxiety, mood swings, or difficulty sleeping  HEME/LYMPH: No easy bruising, or bleeding gums  ALLERY AND IMMUNOLOGIC: No hives or eczema  FAMILY HISTORY:  FH: HTN (hypertension)    T(C): 36.8 (01-08-20 @ 05:39), Max: 37 (01-07-20 @ 16:22)  HR: 73 (01-08-20 @ 05:39) (71 - 73)  BP: 145/78 (01-08-20 @ 05:39) (115/54 - 146/72)  RR: 17 (01-08-20 @ 05:39) (16 - 17)  SpO2: 95% (01-08-20 @ 05:39) (95% - 97%)  Wt(kg): --Vital Signs Last 24 Hrs  T(C): 36.8 (08 Jan 2020 05:39), Max: 37 (07 Jan 2020 16:22)  T(F): 98.2 (08 Jan 2020 05:39), Max: 98.6 (07 Jan 2020 16:22)  HR: 73 (08 Jan 2020 05:39) (71 - 73)  BP: 145/78 (08 Jan 2020 05:39) (115/54 - 146/72)  BP(mean): --  RR: 17 (08 Jan 2020 05:39) (16 - 17)  SpO2: 95% (08 Jan 2020 05:39) (95% - 97%)    T(F): 98.2 (01-08-20 @ 05:39), Max: 99 (01-07-20 @ 05:22)  HR: 73 (01-08-20 @ 05:39) (71 - 89)  BP: 145/78 (01-08-20 @ 05:39) (115/54 - 155/78)  RR: 17 (01-08-20 @ 05:39) (16 - 17)  SpO2: 95% (01-08-20 @ 05:39) (95% - 100%)    PHYSICAL EXAM:  GENERAL: NAD, well-groomed, well-developed  HEAD:  Atraumatic, Normocephalic  EYES: EOMI, PERRLA, conjunctiva and sclera clear  ENMT: No tonsillar erythema, exudates, or enlargement; Moist mucous membranes, Good dentition, No lesions  NECK: Supple, No JVD, Normal thyroid  NERVOUS SYSTEM:  Alert & Oriented X3, Good concentration; Motor Strength 5/5 B/L upper and lower extremities; DTRs 2+ intact and symmetric  CHEST/LUNG: Clear to percussion bilaterally; No rales, rhonchi, wheezing, or rubs  HEART: Regular rate and rhythm; No murmurs, rubs, or gallops  ABDOMEN: Soft, Nontender, Nondistended; Bowel sounds present  EXTREMITIES:  2+ Peripheral Pulses, No clubbing, cyanosis, or edema  LYMPH: No lymphadenopathy noted  SKIN: No rashes or lesions    Consultant(s) Notes Reviewed:  [x ] YES  [ ] NO  Care Discussed with Consultants/Other Providers [ x] YES  [ ] NO    LABS:  01-07    142  |  104  |  15  ----------------------------<  121<H>  3.7   |  27  |  0.76    Ca    8.6      07 Jan 2020 06:20    TPro  8.5<H>  /  Alb  3.3  /  TBili  0.7  /  DBili  x   /  AST  42<H>  /  ALT  18  /  AlkPhos  95  01-06                          12.3   6.19  )-----------( 239      ( 07 Jan 2020 06:20 )             39.1         PT/INR - ( 06 Jan 2020 22:45 )   PT: 12.6 sec;   INR: 1.12 ratio         PTT - ( 06 Jan 2020 22:45 )  PTT:27.6 sec  LIVER FUNCTIONS - ( 06 Jan 2020 22:45 )  Alb: 3.3 g/dL / Pro: 8.5 g/dL / ALK PHOS: 95 U/L / ALT: 18 U/L / AST: 42 U/L / GGT: x                            12.3   6.19  )-----------( 239      ( 01-07 @ 06:20 )             39.1                13.6   7.03  )-----------( 240      ( 01-06 @ 22:45 )             42.5                14.6   7.0   )-----------( 256      ( 12-20 @ 15:36 )             45.3       Hemoglobin A1C, Whole Blood: 6.6 % (01-07-20 @ 06:20)          RADIOLOGY & ADDITIONAL TESTS:    Imaging Personally Reviewed:  [ ] YES  [ ] NO  acetaminophen   Tablet .. 650 milliGRAM(s) Oral every 6 hours PRN  amLODIPine   Tablet 10 milliGRAM(s) Oral daily  ceFAZolin   IVPB 1000 milliGRAM(s) IV Intermittent every 8 hours  enoxaparin Injectable 40 milliGRAM(s) SubCutaneous daily  ferrous    sulfate 325 milliGRAM(s) Oral daily  losartan 100 milliGRAM(s) Oral daily  pantoprazole    Tablet 40 milliGRAM(s) Oral before breakfast      HEALTH ISSUES - PROBLEM Dx:  Cellulitis of leg, right: Cellulitis of leg, right
Patient is a 61y old  Female who presents with a chief complaint of R leg cellulitis (08 Jan 2020 14:35)      INTERVAL HPI/OVERNIGHT EVENTS: no complaints feels tenderness in the leg has resolved      REVIEW OF SYSTEMS:  CONSTITUTIONAL: No fever, weight loss, or fatigue  EYES: No eye pain, visual disturbances, or discharge  ENMT:  No difficulty hearing, tinnitus, vertigo; No sinus or throat pain  NECK: No pain or stiffness  BREASTS: No pain, masses, or nipple discharge  RESPIRATORY: No cough, wheezing, chills or hemoptysis; No shortness of breath  CARDIOVASCULAR: No chest pain, palpitations, dizziness, or leg swelling  GASTROINTESTINAL: No abdominal or epigastric pain. No nausea, vomiting, or hematemesis; No diarrhea or constipation. No melena or hematochezia.  GENITOURINARY: No dysuria, frequency, hematuria, or incontinence  NEUROLOGICAL: No headaches, memory loss, loss of strength, numbness, or tremors  SKIN: No itching, burning, rashes, or lesions   LYMPH NODES: No enlarged glands  ENDOCRINE: No heat or cold intolerance; No hair loss  MUSCULOSKELETAL: No joint pain or swelling; No muscle, back, or extremity pain  PSYCHIATRIC: No depression, anxiety, mood swings, or difficulty sleeping  HEME/LYMPH: No easy bruising, or bleeding gums  ALLERY AND IMMUNOLOGIC: No hives or eczema  FAMILY HISTORY:  FH: HTN (hypertension)    T(C): 36.8 (01-09-20 @ 05:11), Max: 36.8 (01-08-20 @ 10:47)  HR: 73 (01-09-20 @ 05:11) (66 - 92)  BP: 150/79 (01-09-20 @ 05:11) (120/51 - 150/79)  RR: 17 (01-09-20 @ 05:11) (16 - 18)  SpO2: 99% (01-09-20 @ 05:11) (96% - 99%)  Wt(kg): --Vital Signs Last 24 Hrs  T(C): 36.8 (09 Jan 2020 05:11), Max: 36.8 (08 Jan 2020 10:47)  T(F): 98.3 (09 Jan 2020 05:11), Max: 98.3 (08 Jan 2020 16:55)  HR: 73 (09 Jan 2020 05:11) (66 - 92)  BP: 150/79 (09 Jan 2020 05:11) (120/51 - 150/79)  BP(mean): --  RR: 17 (09 Jan 2020 05:11) (16 - 18)  SpO2: 99% (09 Jan 2020 05:11) (96% - 99%)    T(F): 98.3 (01-09-20 @ 05:11), Max: 99 (01-07-20 @ 05:22)  HR: 73 (01-09-20 @ 05:11) (66 - 92)  BP: 150/79 (01-09-20 @ 05:11) (115/54 - 155/78)  RR: 17 (01-09-20 @ 05:11) (16 - 18)  SpO2: 99% (01-09-20 @ 05:11) (95% - 100%)    PHYSICAL EXAM:  GENERAL: NAD, well-groomed, well-developed  HEAD:  Atraumatic, Normocephalic  EYES: EOMI, PERRLA, conjunctiva and sclera clear  ENMT: No tonsillar erythema, exudates, or enlargement; Moist mucous membranes, Good dentition, No lesions  NECK: Supple, No JVD, Normal thyroid  NERVOUS SYSTEM:  Alert & Oriented X3, Good concentration; Motor Strength 5/5 B/L upper and lower extremities; DTRs 2+ intact and symmetric  CHEST/LUNG: Clear to percussion bilaterally; No rales, rhonchi, wheezing, or rubs  HEART: Regular rate and rhythm; No murmurs, rubs, or gallops  ABDOMEN: Soft, Nontender, Nondistended; Bowel sounds present  EXTREMITIES:  2+ Peripheral Pulses, No clubbing, cyanosis, or edema  LYMPH: No lymphadenopathy noted  SKIN: No rashes or lesions    Consultant(s) Notes Reviewed:  [x ] YES  [ ] NO  Care Discussed with Consultants/Other Providers [ x] YES  [ ] NO    LABS:            Culture - Blood (collected 01-06-20 @ 22:45)  Source: .Blood Blood-Peripheral  Preliminary Report (01-08-20 @ 10:02):    No growth to date.    Culture - Blood (collected 01-06-20 @ 22:45)  Source: .Blood Blood-Peripheral  Preliminary Report (01-08-20 @ 10:02):    No growth to date.                             12.3   6.19  )-----------( 239      ( 01-07 @ 06:20 )             39.1                13.6   7.03  )-----------( 240      ( 01-06 @ 22:45 )             42.5                14.6   7.0   )-----------( 256      ( 12-20 @ 15:36 )             45.3       Hemoglobin A1C, Whole Blood: 6.6 % (01-07-20 @ 06:20)          RADIOLOGY & ADDITIONAL TESTS:    Imaging Personally Reviewed:  [ ] YES  [ ] NO  acetaminophen   Tablet .. 650 milliGRAM(s) Oral every 6 hours PRN  amLODIPine   Tablet 10 milliGRAM(s) Oral daily  ceFAZolin   IVPB 1000 milliGRAM(s) IV Intermittent every 8 hours  enoxaparin Injectable 40 milliGRAM(s) SubCutaneous daily  ferrous    sulfate 325 milliGRAM(s) Oral daily  losartan 100 milliGRAM(s) Oral daily  pantoprazole    Tablet 40 milliGRAM(s) Oral before breakfast      HEALTH ISSUES - PROBLEM Dx:  Morbid obesity: Morbid obesity  Cellulitis of leg, right: Cellulitis of leg, right
None

## 2022-03-05 DIAGNOSIS — S52.502A UNSPECIFIED FRACTURE OF THE LOWER END OF LEFT RADIUS, INITIAL ENCOUNTER FOR CLOSED FRACTURE: ICD-10-CM

## 2022-03-05 DIAGNOSIS — Z29.9 ENCOUNTER FOR PROPHYLACTIC MEASURES, UNSPECIFIED: ICD-10-CM

## 2022-03-05 DIAGNOSIS — D72.829 ELEVATED WHITE BLOOD CELL COUNT, UNSPECIFIED: ICD-10-CM

## 2022-03-05 DIAGNOSIS — Z01.818 ENCOUNTER FOR OTHER PREPROCEDURAL EXAMINATION: ICD-10-CM

## 2022-03-05 LAB
APPEARANCE UR: CLEAR — SIGNIFICANT CHANGE UP
BACTERIA # UR AUTO: PRESENT /HPF
BILIRUB UR-MCNC: NEGATIVE — SIGNIFICANT CHANGE UP
COLOR SPEC: YELLOW — SIGNIFICANT CHANGE UP
DIFF PNL FLD: NEGATIVE — SIGNIFICANT CHANGE UP
EPI CELLS # UR: SIGNIFICANT CHANGE UP /HPF (ref 0–5)
GLUCOSE UR QL: NEGATIVE — SIGNIFICANT CHANGE UP
HCV AB S/CO SERPL IA: 0.04 S/CO — SIGNIFICANT CHANGE UP
HCV AB SERPL-IMP: SIGNIFICANT CHANGE UP
KETONES UR-MCNC: NEGATIVE — SIGNIFICANT CHANGE UP
LEUKOCYTE ESTERASE UR-ACNC: ABNORMAL
NITRITE UR-MCNC: NEGATIVE — SIGNIFICANT CHANGE UP
PH UR: 6 — SIGNIFICANT CHANGE UP (ref 5–8)
PROT UR-MCNC: NEGATIVE MG/DL — SIGNIFICANT CHANGE UP
RBC CASTS # UR COMP ASSIST: < 5 /HPF — SIGNIFICANT CHANGE UP
RH IG SCN BLD-IMP: POSITIVE — SIGNIFICANT CHANGE UP
SARS-COV-2 RNA SPEC QL NAA+PROBE: NEGATIVE — SIGNIFICANT CHANGE UP
SP GR SPEC: 1.02 — SIGNIFICANT CHANGE UP (ref 1–1.03)
UROBILINOGEN FLD QL: 0.2 E.U./DL — SIGNIFICANT CHANGE UP
WBC UR QL: < 5 /HPF — SIGNIFICANT CHANGE UP

## 2022-03-05 PROCEDURE — 73100 X-RAY EXAM OF WRIST: CPT | Mod: 26,LT

## 2022-03-05 DEVICE — PEG FIX SMOOTH LOKG 2X18MM: Type: IMPLANTABLE DEVICE | Status: FUNCTIONAL

## 2022-03-05 DEVICE — PEG FIX SMOOTH LOKG 2X20MM: Type: IMPLANTABLE DEVICE | Status: FUNCTIONAL

## 2022-03-05 DEVICE — IMPLANTABLE DEVICE: Type: IMPLANTABLE DEVICE | Status: FUNCTIONAL

## 2022-03-05 DEVICE — KWIRE STD TIP 1.5X127MM: Type: IMPLANTABLE DEVICE | Status: FUNCTIONAL

## 2022-03-05 DEVICE — PLATE GEMINUS VOLAR DIST RAD NRRW 4H LT: Type: IMPLANTABLE DEVICE | Status: FUNCTIONAL

## 2022-03-05 DEVICE — SCREW CORT NON LOKG 3.5X12MM: Type: IMPLANTABLE DEVICE | Status: FUNCTIONAL

## 2022-03-05 DEVICE — IMP VITOSS BB TRUMA FOAM PACK 1.2CC: Type: IMPLANTABLE DEVICE | Status: FUNCTIONAL

## 2022-03-05 RX ORDER — HYDROMORPHONE HYDROCHLORIDE 2 MG/ML
0.5 INJECTION INTRAMUSCULAR; INTRAVENOUS; SUBCUTANEOUS
Refills: 0 | Status: DISCONTINUED | OUTPATIENT
Start: 2022-03-05 | End: 2022-03-06

## 2022-03-05 RX ORDER — CEFAZOLIN SODIUM 1 G
2000 VIAL (EA) INJECTION EVERY 8 HOURS
Refills: 0 | Status: COMPLETED | OUTPATIENT
Start: 2022-03-05 | End: 2022-03-06

## 2022-03-05 RX ORDER — ONDANSETRON 8 MG/1
4 TABLET, FILM COATED ORAL ONCE
Refills: 0 | Status: DISCONTINUED | OUTPATIENT
Start: 2022-03-05 | End: 2022-03-06

## 2022-03-05 RX ORDER — CEPHALEXIN 500 MG
1 CAPSULE ORAL
Qty: 30 | Refills: 0
Start: 2022-03-05 | End: 2022-03-14

## 2022-03-05 RX ADMIN — Medication 1000 MILLIGRAM(S): at 20:02

## 2022-03-05 RX ADMIN — Medication 100 MILLIGRAM(S): at 17:23

## 2022-03-05 RX ADMIN — HYDROMORPHONE HYDROCHLORIDE 0.5 MILLIGRAM(S): 2 INJECTION INTRAMUSCULAR; INTRAVENOUS; SUBCUTANEOUS at 12:45

## 2022-03-05 RX ADMIN — Medication 100 MILLIGRAM(S): at 04:11

## 2022-03-05 RX ADMIN — Medication 975 MILLIGRAM(S): at 02:07

## 2022-03-05 RX ADMIN — Medication 975 MILLIGRAM(S): at 01:07

## 2022-03-05 RX ADMIN — HYDROMORPHONE HYDROCHLORIDE 0.5 MILLIGRAM(S): 2 INJECTION INTRAMUSCULAR; INTRAVENOUS; SUBCUTANEOUS at 12:30

## 2022-03-05 RX ADMIN — Medication 400 MILLIGRAM(S): at 19:32

## 2022-03-05 RX ADMIN — Medication 1 APPLICATION(S): at 07:39

## 2022-03-05 RX ADMIN — Medication 1 TABLET(S): at 17:25

## 2022-03-05 NOTE — DISCHARGE NOTE PROVIDER - PROVIDER TOKENS
PROVIDER:[TOKEN:[[id number]],FOLLOWUP:[2 weeks]],FREE:[LAST:[Orthopedic Surgery],PHONE:[(   )    -],FAX:[(   )    -],ADDRESS:[Follow up with local Orthopedic Surgery if not planning to see Dr. Huang post op. You should follow up within 2 weeks of surgery.]]

## 2022-03-05 NOTE — BRIEF OPERATIVE NOTE - NSICDXBRIEFPOSTOP_GEN_ALL_CORE_FT
POST-OP DIAGNOSIS:  Fracture of distal end of left radius and ulna 05-Mar-2022 11:37:00  Derek Mendoza  Acute carpal tunnel syndrome, left 05-Mar-2022 11:37:42  Derek Mendoza   Pt. with RICCO, likely hemodynamically mediated in setting of bacteremia and decreased oral intake. Scr was 1.3 on labs done on 7/2/18, increased to 2.2 on 7/11/18 and has improved to 1.74 today. Recommend continuing IV NS @ 75 cc/hr. Check sonogram of kidney allograft. Monitor labs and urine output. Avoid any potential nephrotoxins

## 2022-03-05 NOTE — DISCHARGE NOTE PROVIDER - CARE PROVIDER_API CALL
Glynn Huang)  Orthopaedic Surgery  [address] [place] [address], [address]  [place] [address]  Phone: [phone]  Fax: [phone]  Follow Up Time: 2 weeks    Orthopedic Surgery,   Follow up with local Orthopedic Surgery if not planning to see Dr. Huang post op. You should follow up within 2 weeks of surgery.  Phone: (   )    -  Fax: (   )    -  Follow Up Time:

## 2022-03-05 NOTE — DISCHARGE NOTE PROVIDER - NSDCMRMEDTOKEN_GEN_ALL_CORE_FT
cephalexin 500 mg oral tablet: 1 tab(s) orally 3 times a day   Oxaydo 5 mg oral tablet: 1-2 tab(s) orally every 4 to 6 hours, as needed for moderate to severe pain MDD:4 tabs

## 2022-03-05 NOTE — PATIENT PROFILE ADULT - FALL HARM RISK - HARM RISK INTERVENTIONS

## 2022-03-05 NOTE — BRIEF OPERATIVE NOTE - NSICDXBRIEFPREOP_GEN_ALL_CORE_FT
PRE-OP DIAGNOSIS:  Fracture of distal end of left radius and ulna 05-Mar-2022 11:36:56  Derek Mendoza  Acute carpal tunnel syndrome, left 05-Mar-2022 11:37:33  Derek Mendoza

## 2022-03-05 NOTE — BRIEF OPERATIVE NOTE - NSICDXBRIEFPROCEDURE_GEN_ALL_CORE_FT
PROCEDURES:  Open reduction and internal fixation (ORIF) of fracture of left distal radius using bone graft 05-Mar-2022 11:38:04  Derek Mendoza  Carpal tunnel release 05-Mar-2022 11:38:15  Derek Mendoza

## 2022-03-05 NOTE — BRIEF OPERATIVE NOTE - ASSISTANT(S)
Dr Chris Bingham, PGY3 Abdomen soft, nontender, nondistended, bowel sounds present in all 4 quadrants.

## 2022-03-05 NOTE — DISCHARGE NOTE PROVIDER - NSDCCPTREATMENT_GEN_ALL_CORE_FT
PRINCIPAL PROCEDURE  Procedure: ORIF, fracture, radius, distal, with carpal tunnel release  Findings and Treatment:

## 2022-03-05 NOTE — DISCHARGE NOTE PROVIDER - NSDCCPCAREPLAN_GEN_ALL_CORE_FT
PRINCIPAL DISCHARGE DIAGNOSIS  Diagnosis: Left wrist fracture  Assessment and Plan of Treatment:

## 2022-03-05 NOTE — DISCHARGE NOTE PROVIDER - NSDCFUADDINST_GEN_ALL_CORE_FT
Non weight bearing left wrist   Keep splint clean/dry/intact at all times until you see a surgeon for follow up.  OK to shower- splint should be well protected in a plastic bag- do not get it wet   No strenuous activity, heavy lifting, driving or returning to work until cleared by MD.  Keep arm elevated as much as possible to reduce swelling in your wrist/hand/fingers and prevent developing   Take antibiotics and pain medicine as directed   Try to have regular bowel movements, take stool softener or laxative if necessary with pain medication   Call to schedule an appt with a local orthopedic surgeon within 2 weeks if you will not be seeing Dr. Huang post op- if you have staples or sutures they will be removed in office.  Contact your doctor if you experience: fever greater than 101.5, chills, chest  pain, difficulty breathing, redness or excessive drainage around the incision, other concerns.  Follow up with your primary care provider.

## 2022-03-05 NOTE — DISCHARGE NOTE PROVIDER - HOSPITAL COURSE
Admitted to Orthopedics 3/4  Attending: Dr. Huang   Surgery: ORIF for Open left distal radius fracture   Mary-op Antibiotics  Pain control  DVT prophylaxis  OOB/Physical Therapy  Medical optimization preop

## 2022-03-06 VITALS
TEMPERATURE: 98 F | OXYGEN SATURATION: 97 % | RESPIRATION RATE: 18 BRPM | DIASTOLIC BLOOD PRESSURE: 82 MMHG | SYSTOLIC BLOOD PRESSURE: 130 MMHG | HEART RATE: 91 BPM

## 2022-03-06 LAB
ANION GAP SERPL CALC-SCNC: 10 MMOL/L — SIGNIFICANT CHANGE UP (ref 5–17)
BUN SERPL-MCNC: 13 MG/DL — SIGNIFICANT CHANGE UP (ref 7–23)
CALCIUM SERPL-MCNC: 9.6 MG/DL — SIGNIFICANT CHANGE UP (ref 8.4–10.5)
CHLORIDE SERPL-SCNC: 104 MMOL/L — SIGNIFICANT CHANGE UP (ref 96–108)
CO2 SERPL-SCNC: 23 MMOL/L — SIGNIFICANT CHANGE UP (ref 22–31)
CREAT SERPL-MCNC: 0.78 MG/DL — SIGNIFICANT CHANGE UP (ref 0.5–1.3)
EGFR: 86 ML/MIN/1.73M2 — SIGNIFICANT CHANGE UP
GLUCOSE SERPL-MCNC: 125 MG/DL — HIGH (ref 70–99)
HCT VFR BLD CALC: 39 % — SIGNIFICANT CHANGE UP (ref 34.5–45)
HGB BLD-MCNC: 13.4 G/DL — SIGNIFICANT CHANGE UP (ref 11.5–15.5)
MCHC RBC-ENTMCNC: 30.7 PG — SIGNIFICANT CHANGE UP (ref 27–34)
MCHC RBC-ENTMCNC: 34.4 GM/DL — SIGNIFICANT CHANGE UP (ref 32–36)
MCV RBC AUTO: 89.2 FL — SIGNIFICANT CHANGE UP (ref 80–100)
NRBC # BLD: 0 /100 WBCS — SIGNIFICANT CHANGE UP (ref 0–0)
PLATELET # BLD AUTO: 247 K/UL — SIGNIFICANT CHANGE UP (ref 150–400)
POTASSIUM SERPL-MCNC: 3.8 MMOL/L — SIGNIFICANT CHANGE UP (ref 3.5–5.3)
POTASSIUM SERPL-SCNC: 3.8 MMOL/L — SIGNIFICANT CHANGE UP (ref 3.5–5.3)
RBC # BLD: 4.37 M/UL — SIGNIFICANT CHANGE UP (ref 3.8–5.2)
RBC # FLD: 12.9 % — SIGNIFICANT CHANGE UP (ref 10.3–14.5)
SODIUM SERPL-SCNC: 137 MMOL/L — SIGNIFICANT CHANGE UP (ref 135–145)
WBC # BLD: 13.49 K/UL — HIGH (ref 3.8–10.5)
WBC # FLD AUTO: 13.49 K/UL — HIGH (ref 3.8–10.5)

## 2022-03-06 PROCEDURE — 85027 COMPLETE CBC AUTOMATED: CPT

## 2022-03-06 PROCEDURE — 87635 SARS-COV-2 COVID-19 AMP PRB: CPT

## 2022-03-06 PROCEDURE — 71045 X-RAY EXAM CHEST 1 VIEW: CPT

## 2022-03-06 PROCEDURE — 73100 X-RAY EXAM OF WRIST: CPT

## 2022-03-06 PROCEDURE — 86900 BLOOD TYPING SEROLOGIC ABO: CPT

## 2022-03-06 PROCEDURE — 99285 EMERGENCY DEPT VISIT HI MDM: CPT

## 2022-03-06 PROCEDURE — 85025 COMPLETE CBC W/AUTO DIFF WBC: CPT

## 2022-03-06 PROCEDURE — 96375 TX/PRO/DX INJ NEW DRUG ADDON: CPT

## 2022-03-06 PROCEDURE — 81001 URINALYSIS AUTO W/SCOPE: CPT

## 2022-03-06 PROCEDURE — 86803 HEPATITIS C AB TEST: CPT

## 2022-03-06 PROCEDURE — 86850 RBC ANTIBODY SCREEN: CPT

## 2022-03-06 PROCEDURE — 73200 CT UPPER EXTREMITY W/O DYE: CPT | Mod: MA

## 2022-03-06 PROCEDURE — 85610 PROTHROMBIN TIME: CPT

## 2022-03-06 PROCEDURE — 86901 BLOOD TYPING SEROLOGIC RH(D): CPT

## 2022-03-06 PROCEDURE — C1889: CPT

## 2022-03-06 PROCEDURE — 36415 COLL VENOUS BLD VENIPUNCTURE: CPT

## 2022-03-06 PROCEDURE — 80048 BASIC METABOLIC PNL TOTAL CA: CPT

## 2022-03-06 PROCEDURE — C1713: CPT

## 2022-03-06 PROCEDURE — 96374 THER/PROPH/DIAG INJ IV PUSH: CPT

## 2022-03-06 PROCEDURE — C9399: CPT

## 2022-03-06 PROCEDURE — 85730 THROMBOPLASTIN TIME PARTIAL: CPT

## 2022-03-06 RX ORDER — OXYCODONE HYDROCHLORIDE 5 MG/1
1 TABLET ORAL
Qty: 18 | Refills: 0
Start: 2022-03-06 | End: 2022-03-08

## 2022-03-06 RX ADMIN — Medication 975 MILLIGRAM(S): at 12:22

## 2022-03-06 RX ADMIN — Medication 975 MILLIGRAM(S): at 11:22

## 2022-03-06 RX ADMIN — Medication 100 MILLIGRAM(S): at 00:05

## 2022-03-06 RX ADMIN — Medication 975 MILLIGRAM(S): at 01:00

## 2022-03-06 RX ADMIN — Medication 975 MILLIGRAM(S): at 00:13

## 2022-03-06 RX ADMIN — Medication 100 MILLIGRAM(S): at 09:21

## 2022-03-06 NOTE — PROGRESS NOTE ADULT - SUBJECTIVE AND OBJECTIVE BOX
Ortho Post Op Check    Procedure: L DR TOLEDO   Surgeon: Reina     Pt comfortable without complaints, pain controlled. States that diffuse numbness/tingling in L hand has significantly reduced since surgery   Denies CP, SOB, N/V,    Vital Signs Last 24 Hrs  T(C): 37.1 (03-05-22 @ 20:15), Max: 37.1 (03-05-22 @ 20:15)  T(F): 98.7 (03-05-22 @ 20:15), Max: 98.7 (03-05-22 @ 20:15)  HR: 83 (03-05-22 @ 20:15) (83 - 83)  BP: 131/72 (03-05-22 @ 20:15) (131/72 - 131/72)  BP(mean): --  RR: 17 (03-05-22 @ 20:15) (17 - 17)  SpO2: 96% (03-05-22 @ 20:15) (96% - 96%)    General: Pt Alert and oriented, NAD  LUE in sugartong splint  Pulses: unable to assess due to LUE in splint; however cap refill of L hand <2 sec and fingers WWP  Sensation: SILT in all 5 fingers  Motor: able to flex/extend all 5 fingers     Post-op X-Ray: hardware intact in L wrist     A/P: 62yFemale s/p L DR TOLEDO  by Dr. PRABHU Huang on 03-05  - Stable  - Pain Control  - DVT ppx: SCDs  - Post op abx: IV ancef for 24 hrs then 10 days of PO keflex on d/c   - WBS: NWB LUE     Ortho Pager [phone]
Ortho AM Note    Procedure: L DR TOLEDO, CTR, ganglion excision, I&D, bone grafting   Surgeon: Reina     Pt comfortable without complaints, pain controlled. States that diffuse numbness/tingling in L hand has significantly reduced since surgery   Denies CP, SOB, N/V,    Vital Signs Last 24 Hrs  T(C): 37 (03-06-22 @ 04:40), Max: 37 (03-06-22 @ 04:40)  T(F): 98.6 (03-06-22 @ 04:40), Max: 98.6 (03-06-22 @ 04:40)  HR: 69 (03-06-22 @ 04:40) (69 - 69)  BP: 145/69 (03-06-22 @ 04:40) (145/69 - 145/69)  BP(mean): --  RR: 17 (03-06-22 @ 04:40) (17 - 17)  SpO2: 96% (03-06-22 @ 04:40) (96% - 96%)    General: Pt Alert and oriented, NAD  LUE in sugartong splint  Pulses: unable to assess due to LUE in splint; however cap refill of L hand <2 sec and fingers WWP  Sensation: SILT in all 5 fingers  Motor: able to flex/extend all 5 fingers       A/P: 62yFemale s/p L DR TOLEDO  by Dr. PRABHU Huang on 03-05  - Stable  - Pain Control  - DVT ppx: SCDs  - Post op abx: IV ancef for 24 hrs then 10 days of PO keflex on d/c   - WBS: NWB LUE     Ortho Pager [phone]
Ortho Note    Comfortable in splint this Am.   complaining of numbness and tingling to first 3 digits.   Denies CP, SOB, N/V, numbness/tingling     Vital Signs Last 24 Hrs  T(C): 36.9 (03-05-22 @ 05:29), Max: 36.9 (03-05-22 @ 04:30)  T(F): 98.4 (03-05-22 @ 05:29), Max: 98.4 (03-05-22 @ 04:30)  HR: 70 (03-05-22 @ 05:29) (70 - 70)  BP: 145/71 (03-05-22 @ 05:29) (145/71 - 145/71)  BP(mean): --  RR: 17 (03-05-22 @ 05:29) (17 - 17)  SpO2: 97% (03-05-22 @ 05:29) (97% - 98%)  AVSS    General: Pt Alert and oriented, NAD  sugar tong splint in place  moderate swelling at finger tips  Pulses: cap refill < 2 secs  Sensation: Numbness to first 3 digits, in median nerve distribution   Motor: firing AIN/PIN/U difficult to get thumb opposition due to pain        A/P: 62yFemale w a L open distal redius fx, washed out in ED for OR this AM for washout, orif and grafting of L distal radius and carpal tunnel release  - Stable  - Pain Control  - NPO/IVF  - DVT ppx: SCDs  - PT, WBS: NWSHELTON BARTON    Ortho Pager [phone]

## 2022-03-06 NOTE — DISCHARGE NOTE NURSING/CASE MANAGEMENT/SOCIAL WORK - PATIENT PORTAL LINK FT
You can access the FollowMyHealth Patient Portal offered by [facility] by registering at the following website: [email or web address]. By joining [facility]’s FollowMyHealth portal, you will also be able to view your health information using other applications (apps) compatible with our system.

## 2022-03-06 NOTE — DISCHARGE NOTE NURSING/CASE MANAGEMENT/SOCIAL WORK - NSDCPEFALRISK_GEN_ALL_CORE
For information on Fall & Injury Prevention, visit: [email or web address] OR  [email or web address] OR  https://www.cdc.gov/steadi/patient.html

## 2022-08-25 NOTE — PATIENT PROFILE ADULT - HOME ACCESSIBILITY CONCERNS
[FreeTextEntry1] : Trial of celebrex with food for 7-10 days\par Heating pad\par \par F/U if symptoms do not resolve, or if they should change/worsen.\par 
none

## 2024-04-05 NOTE — DISCHARGE NOTE PROVIDER - CARE PROVIDERS DIRECT ADDRESSES
7 y/o A&Ox4 presents with mom following a fall down 1 to 2 steps. Patient did hit her head and has a small laceration on the top of her forehead. It is not bleeding. Mom states she poured some peroxide on it and applied neosporin. Patient endorses head pain, but no pain anywhere else. Denies feeling dizzy and denies any vision problems.    ,DirectAddress_Unknown,DirectAddress_Unknown

## (undated) DEVICE — DRAPE C ARM MINI PACK FOR 6800

## (undated) DEVICE — DRIVER UNIVERSAL QUICK CONNECT T10

## (undated) DEVICE — DRSG KERLIX ROLL 4.5"

## (undated) DEVICE — TOURNIQUET CUFF 24" DUAL PORT SINGLE BLADDER W PLC (BLACK)

## (undated) DEVICE — SLV COMPRESSION KNEE MED

## (undated) DEVICE — DRILL SOLID SIDE CUTTING 2.5X40MM

## (undated) DEVICE — TOURNIQUET CUFF 18" DUAL PORT SINGLE BLADDER W PLC  (BLACK)

## (undated) DEVICE — DRILL SOLID SIDE CUTTING 2X40MM

## (undated) DEVICE — GLV 8 PROTEXIS (WHITE)

## (undated) DEVICE — BLADE SCALPEL SAFETY #15 WITH PLASTIC GREEN HANDLE

## (undated) DEVICE — PACK HAND

## (undated) DEVICE — DRIVER AO CONNECTION SQUARE TIP 2.0MM

## (undated) DEVICE — MARKING PEN W RULER

## (undated) DEVICE — DRSG STERISTRIPS 0.25 X 3"

## (undated) DEVICE — WARMING BLANKET LOWER ADULT

## (undated) DEVICE — GUIDE AIM-ING GUIDES 1.5MM